# Patient Record
Sex: FEMALE | Race: BLACK OR AFRICAN AMERICAN | Employment: UNEMPLOYED | URBAN - METROPOLITAN AREA
[De-identification: names, ages, dates, MRNs, and addresses within clinical notes are randomized per-mention and may not be internally consistent; named-entity substitution may affect disease eponyms.]

---

## 2024-11-18 ENCOUNTER — HOSPITAL ENCOUNTER (EMERGENCY)
Age: 59
Discharge: HOME | End: 2024-11-18
Payer: COMMERCIAL

## 2024-11-18 VITALS
BODY MASS INDEX: 32.58 KG/M2 | DIASTOLIC BLOOD PRESSURE: 78 MMHG | RESPIRATION RATE: 17 BRPM | WEIGHT: 220 LBS | HEIGHT: 69 IN | HEART RATE: 70 BPM | TEMPERATURE: 97.9 F | OXYGEN SATURATION: 99 % | SYSTOLIC BLOOD PRESSURE: 138 MMHG

## 2024-11-18 DIAGNOSIS — N63.21 MASS OF UPPER OUTER QUADRANT OF LEFT BREAST: Primary | ICD-10-CM

## 2024-11-18 PROCEDURE — 99282 EMERGENCY DEPT VISIT SF MDM: CPT

## 2024-11-18 ASSESSMENT — PAIN SCALES - GENERAL: PAINLEVEL_OUTOF10: 2

## 2024-11-18 ASSESSMENT — PAIN - FUNCTIONAL ASSESSMENT
PAIN_FUNCTIONAL_ASSESSMENT: 0-10
PAIN_FUNCTIONAL_ASSESSMENT: NONE - DENIES PAIN

## 2024-11-18 NOTE — ED PROVIDER NOTES
Emergency Department Provider Note       PCP: No primary care provider on file.   Age: 58 y.o.   Sex: female     DISPOSITION Decision To Discharge 11/18/2024 12:07:04 PM    ICD-10-CM    1. Mass of upper outer quadrant of left breast  N63.21           Medical Decision Making     58-year-old female presents emergency department today with complaint of right breast mass.  She appears in no acute distress.  She does have an mass noted at approximately 11:00 on outer quadrant of left breast.  No fluctuance or signs of infectious process.  Discussed with patient that she will need to discuss with her PCP and have another mammogram ordered.  Patient verbalizes understanding and will call their office today.     1 acute, uncomplicated illness or injury.  Shared medical decision making was utilized in creating the patients health plan today.  I independently ordered and reviewed each unique test.                         History     58-year-old female presents to the emergency department today with complaint of a left breast mass.  Patient states she first noticed the area about 2 weeks ago.  She denies any changes to the area.  She denies any signs of infection.  She states the area is tender.  She states that she did have a mammogram last year that was normal.  She denies any family history of breast cancer.  She states that she does have a history of cancer but cannot remember what kind of cancer she had.    The history is provided by the patient.     Physical Exam     Vitals signs and nursing note reviewed:  Vitals:    11/18/24 1136   BP: (!) 143/76   Pulse: 63   Resp: 18   Temp: 97.9 °F (36.6 °C)   TempSrc: Oral   SpO2: 98%   Weight: 99.8 kg (220 lb)   Height: 1.753 m (5' 9\")      Physical Exam  Vitals and nursing note reviewed. Exam conducted with a chaperone present.   Constitutional:       General: She is not in acute distress.     Appearance: Normal appearance. She is well-developed. She is not ill-appearing,

## 2024-11-18 NOTE — DISCHARGE INSTRUCTIONS
As we discussed, you will need to have your primary care provider order a mammogram.  Please call their office and schedule an appointment.  Check with them to see if they can go ahead and order the mammogram.  Return to the emergency department if you develop any new, worsening, or concerning symptoms.